# Patient Record
Sex: MALE | Race: WHITE
[De-identification: names, ages, dates, MRNs, and addresses within clinical notes are randomized per-mention and may not be internally consistent; named-entity substitution may affect disease eponyms.]

---
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
## 2022-05-20 ENCOUNTER — HOSPITAL ENCOUNTER (EMERGENCY)
Dept: HOSPITAL 47 - EC | Age: 22
LOS: 1 days | Discharge: HOME | End: 2022-05-21
Payer: COMMERCIAL

## 2022-05-20 VITALS
SYSTOLIC BLOOD PRESSURE: 131 MMHG | HEART RATE: 82 BPM | DIASTOLIC BLOOD PRESSURE: 75 MMHG | TEMPERATURE: 98.3 F | RESPIRATION RATE: 18 BRPM

## 2022-05-20 DIAGNOSIS — F29: Primary | ICD-10-CM

## 2022-05-20 DIAGNOSIS — F17.209: ICD-10-CM

## 2022-05-20 DIAGNOSIS — Z88.8: ICD-10-CM

## 2022-05-20 PROCEDURE — 82075 ASSAY OF BREATH ETHANOL: CPT

## 2022-05-20 PROCEDURE — 80306 DRUG TEST PRSMV INSTRMNT: CPT

## 2022-05-20 PROCEDURE — 99284 EMERGENCY DEPT VISIT MOD MDM: CPT

## 2022-05-21 NOTE — ED
Psych HPI





- General


Chief Complaint: Psychiatric Symptoms


Stated Complaint: Mental health eval


Time Seen by Provider: 05/21/22 02:18


Source: patient, RN notes reviewed, old records reviewed


Mode of arrival: ambulatory


Limitations: no limitations





- History of Present Illness


Initial Comments: 





This is a 21-year-old male to the emergency department for evaluation.  Patient 

comes today for evaluation stating he needs psychiatric evaluation and 

treatmentpatient states he is hearing voices believes it may be due to his 

medications unsure.  Not homicidal not suicidal


MD Complaint: altered mental status, other


-: unknown


Associated Psychiatric Symptoms: auditory hallucinations


History of same: No


Quality: constant, getting worse


Improves With: none


Worsens With: medication


Associated Symptoms: denies other symptoms


Treatments Prior to Arrival: placed on mental health hold





- Related Data


                                  Previous Rx's











 Medication  Instructions  Recorded


 


Benztropine Mesylate [Cogentin] 0.5 mg PO BID 30 Days  tab 04/08/22


 


Divalproex [Depakote] 750 mg PO HS 30 Days  tablet 04/08/22


 


Nicotine 14Mg/24Hr Patch [Habitrol] 1 patch TRANSDERM DAILY 30 Days 04/08/22





 patch 


 


risperiDONE [RisperDAL] 2.5 mg PO BID 30 Days  tab 04/08/22











                                    Allergies











Allergy/AdvReac Type Severity Reaction Status Date / Time


 


haloperidol [From Haldol] Allergy  Swelling Verified 05/20/22 21:40














Review of Systems


ROS Statement: 


Those systems with pertinent positive or pertinent negative responses have been 

documented in the HPI.





ROS Other: All systems not noted in ROS Statement are negative.





Past Medical History


Past Medical History: No Reported History


History of Any Multi-Drug Resistant Organisms: None Reported


Past Surgical History: No Surgical Hx Reported


Past Anesthesia/Blood Transfusion Reactions: No Reported Reaction


Past Psychological History: Bipolar, Depression


Smoking Status: Current every day smoker, Vaper


Past Alcohol Use History: Occasional


Past Drug Use History: Marijuana, Methamphetamine, Prescription Drug Abuse





General Exam


Limitations: no limitations


General appearance: alert, in no apparent distress


Head exam: Present: atraumatic, normocephalic, normal inspection


Eye exam: Present: normal appearance, PERRL, EOMI.  Absent: scleral icterus, 

conjunctival injection, periorbital swelling


ENT exam: Present: normal exam, mucous membranes moist


Neck exam: Present: normal inspection.  Absent: tenderness, meningismus, 

lymphadenopathy


Respiratory exam: Present: normal lung sounds bilaterally.  Absent: respiratory 

distress, wheezes, rales, rhonchi, stridor


Cardiovascular Exam: Present: regular rate, normal rhythm, normal heart sounds. 

Absent: systolic murmur, diastolic murmur, rubs, gallop, clicks


GI/Abdominal exam: Present: soft, normal bowel sounds.  Absent: distended, 

tenderness, guarding, rebound, rigid


Extremities exam: Present: normal inspection, full ROM, normal capillary refill.

 Absent: tenderness, pedal edema, joint swelling, calf tenderness


Back exam: Present: normal inspection


Neurological exam: Present: alert, oriented X3, CN II-XII intact


Psychiatric exam: Present: normal affect, normal mood


Skin exam: Present: warm, dry, intact, normal color.  Absent: rash





Course


                                   Vital Signs











  05/20/22





  21:38


 


Temperature 98.3 F


 


Pulse Rate 82


 


Respiratory 18





Rate 


 


Blood Pressure 131/75


 


O2 Sat by Pulse 97





Oximetry 














- Reevaluation(s)


Reevaluation #1: 





05/21/22 03:04


medical record is reviewed


Reevaluation #2: 





05/21/22 03:04


medically clear for psychiatric evaluation





Medical Decision Making





- Medical Decision Making





21 male presents today for evaluation monitor hallucinations.  Patient is not 

homicidal or suicidal contract for safety seen by psychiatry and can be 

discharged home





- Lab Data


                                   Lab Results











  05/21/22 Range/Units





  02:53 


 


Urine Opiates Screen  Not Detected  (NotDetected)  


 


Ur Oxycodone Screen  Not Detected  (NotDetected)  


 


Urine Methadone Screen  Not Detected  (NotDetected)  


 


Ur Propoxyphene Screen  Not Detected  (NotDetected)  


 


Ur Barbiturates Screen  Not Detected  (NotDetected)  


 


U Tricyclic Antidepress  Not Detected  (NotDetected)  


 


Ur Phencyclidine Scrn  Not Detected  (NotDetected)  


 


Ur Amphetamines Screen  Not Detected  (NotDetected)  


 


U Methamphetamines Scrn  Not Detected  (NotDetected)  


 


U Benzodiazepines Scrn  Not Detected  (NotDetected)  


 


Urine Cocaine Screen  Not Detected  (NotDetected)  


 


U Marijuana (THC) Screen  Detected H  (NotDetected)  














Disposition


Clinical Impression: 


 Acute psychosis, Psychosis





Disposition: HOME SELF-CARE


Condition: Fair


Instructions (If sedation given, give patient instructions):  Acute Delirium 

(ED)


Is patient prescribed a controlled substance at d/c from ED?: No


Referrals: 


None,Stated [Primary Care Provider] - 1-2 days